# Patient Record
Sex: MALE | URBAN - METROPOLITAN AREA
[De-identification: names, ages, dates, MRNs, and addresses within clinical notes are randomized per-mention and may not be internally consistent; named-entity substitution may affect disease eponyms.]

---

## 2020-04-08 ENCOUNTER — OFFICE VISIT (OUTPATIENT)
Dept: URBAN - METROPOLITAN AREA CLINIC 76 | Facility: CLINIC | Age: 80
End: 2020-04-08
Payer: MEDICARE

## 2020-04-08 DIAGNOSIS — Z96.1 PRESENCE OF INTRAOCULAR LENS: ICD-10-CM

## 2020-04-08 DIAGNOSIS — H40.212 ACUTE ANGLE-CLOSURE GLAUCOMA, LEFT EYE: Primary | ICD-10-CM

## 2020-04-08 PROCEDURE — 66761 REVISION OF IRIS: CPT | Performed by: OPHTHALMOLOGY

## 2020-04-08 PROCEDURE — 92004 COMPRE OPH EXAM NEW PT 1/>: CPT | Performed by: OPHTHALMOLOGY

## 2020-04-08 PROCEDURE — 92020 GONIOSCOPY: CPT | Performed by: OPHTHALMOLOGY

## 2020-04-08 ASSESSMENT — INTRAOCULAR PRESSURE
OS: 20
OD: 9

## 2020-04-08 NOTE — IMPRESSION/PLAN
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 Left. secondary to SO Plan: Discussed diagnosis in detail with patient. Recommend Yag PI OS today. Discussed r/b/a of PI. Pt would like to proceed. Continue Dorz/Timolol BID OS, Alphagan TID OS, Durezol QD OS, Ocuflox QID OS and Acetazolamide 250mg TID PO. Energy: 3.4mj  #shots : 744. PI done w/o complications.

## 2020-04-14 ENCOUNTER — POST-OPERATIVE VISIT (OUTPATIENT)
Dept: URBAN - METROPOLITAN AREA CLINIC 76 | Facility: CLINIC | Age: 80
End: 2020-04-14
Payer: MEDICARE

## 2020-04-14 ASSESSMENT — INTRAOCULAR PRESSURE
OD: 10
OS: 24

## 2020-04-14 NOTE — IMPRESSION/PLAN
Impression: S/P YAG PI OS - 7 Days. Secondary angle closure. PI's open, however peripheral AC still very shallow. Large bolus of s.o in posterior chamber, and looks like large amount of s.o in [facility] as well. Pt stopped acetazolamide due to side effects. IOP's ok at this time. Plan: Continue all drops at this time. Will need to contact Dr. Mike Camilo. May need to consider removal of s.o from anterior segment. Will followup in 1 month as placeholder appointment at this time. Continue Dorzolamide-Timolol BID OS, Alphagan-P TID OS, OK to stay off Acetazolamide for now.  Durezol and Ocuflox QD OS